# Patient Record
Sex: FEMALE | Race: WHITE | URBAN - METROPOLITAN AREA
[De-identification: names, ages, dates, MRNs, and addresses within clinical notes are randomized per-mention and may not be internally consistent; named-entity substitution may affect disease eponyms.]

---

## 2017-05-11 ENCOUNTER — OFFICE VISIT (OUTPATIENT)
Dept: CARDIOLOGY CLINIC | Age: 77
End: 2017-05-11

## 2017-05-11 VITALS
SYSTOLIC BLOOD PRESSURE: 142 MMHG | BODY MASS INDEX: 41.88 KG/M2 | HEART RATE: 53 BPM | OXYGEN SATURATION: 98 % | RESPIRATION RATE: 16 BRPM | DIASTOLIC BLOOD PRESSURE: 80 MMHG | HEIGHT: 62 IN | WEIGHT: 227.6 LBS

## 2017-05-11 DIAGNOSIS — I83.893 VARICOSE VEINS OF LOWER EXTREMITY WITH EDEMA, BILATERAL: ICD-10-CM

## 2017-05-11 DIAGNOSIS — I48.0 PAROXYSMAL A-FIB (HCC): ICD-10-CM

## 2017-05-11 DIAGNOSIS — I73.9 CLAUDICATION OF BOTH LOWER EXTREMITIES (HCC): ICD-10-CM

## 2017-05-11 DIAGNOSIS — E11.9 CONTROLLED TYPE 2 DIABETES MELLITUS WITHOUT COMPLICATION, WITHOUT LONG-TERM CURRENT USE OF INSULIN (HCC): ICD-10-CM

## 2017-05-11 DIAGNOSIS — I10 BENIGN ESSENTIAL HTN: ICD-10-CM

## 2017-05-11 DIAGNOSIS — I20.0 UNSTABLE ANGINA (HCC): Primary | ICD-10-CM

## 2017-05-11 DIAGNOSIS — Z79.01 CHRONIC ANTICOAGULATION: ICD-10-CM

## 2017-05-11 PROBLEM — I83.899 VARICOSE VEINS OF LOWER EXTREMITY WITH EDEMA: Status: ACTIVE | Noted: 2017-05-11

## 2017-05-11 NOTE — PROGRESS NOTES
[address]: Mukesh  [phone]    HPI:   Ms. Soren Alba is a 65 yo F with DM, HTN, hyperlipidemia seen initially afib with RVR on 7/4/13. She was started on diltiazem added to her BB for rate control and coumadin for anticoagulation (newer agents were cost prohibitive). Nuclear stress test on 7/17/13 noted a small partly reversible anterior defect, EF > 65. She was asymptomatic decided to medically manage. 9/2014 echo was normal with EF 65%. Echo 10/2016 was normal.    Since her last visit, she has noticed some more restriction with her breathing with exertion. She will more easily get out of breath when she is walking in the street. She also notes when she walks, she will get aching in her legs. She still has significant lower extremity edema, left greater than right. No palpitations. Her weight is unchanged at 227 pounds with blood pressure of 142/80 and a heart rate of 53. She is compensated on exam with clear lungs. Assessment and Plan:   1. Unstable angina. Exertional shortness of breath that is concerning for possible anginal equivalent and will proceed with a stress test for further evaluation. She cannot complete a treadmill due to her joint issues and will do this Lexiscan. She already had an echocardiogram a few months ago that was normal and no need to repeat this. 2. Lower extremity edema. Will obtain an ultrasound of her legs to evaluate further. Probably venous insufficiency if this is normal.  3. Claudication. Will obtain ABIs. 4. Paroxysmal atrial fibrillation. Stable in sinus rhythm. Will continue beta-blocker and calcium channel blocker. 5. Chronic anticoagulation. No bleeding issues on Coumadin. 6. Iron deficiency anemia. Followed by hematology, Dr. eSra Logan. 7. Essential hypertension. Blood pressure is controlled and no changes made. She has some white coat hypertension, mildly elevated here. 8. Mixed hyperlipidemia. Tolerating statin. 9. Type 2 diabetes. PCP Adry Price, [address] [facility] [address]   [phone]    no longer needing iron          She  has a past medical history of Diabetes ([facility] Essential hypertension; and Hyperlipidemia. All other systems negative except as above. PE  Vitals:    05/11/17 1201   BP: 142/80   Pulse: (!) 53   Resp: 16   SpO2: 98%   Weight: 227 lb 9.6 oz (103.2 kg)   Height: 5' 2\" (1.575 m)    Body mass index is 41.63 kg/(m^2). General appearance - alert, well appearing, and in no distress  Mental status - affect appropriate to mood  Eyes - sclera anicteric, moist mucous membranes  Neck - supple, no JVD  Chest - clear to auscultation, no wheezes, rales or rhonchi  Heart - normal rate, regular rhythm, normal S1, S2, I-II/VI systolic murmur LUSB  Abdomen - soft, nontender, nondistended, no masses or organomegaly  Extremities - peripheral pulses normal, no pedal edema    Recent Labs:  No results found for: CHOL  No results found for: MIRIAN  No results found for: BUN  No results found for: K  No results found for: HBA1C, [id number]  No components found for: HGBI,  IHGB,  HGB,  HGBP,  WBHGB  No results found for: PLT, PLTEXT    Reviewed:  Past Medical History:   Diagnosis Date    Diabetes ([facility]     Essential hypertension     Hyperlipidemia      History   Smoking Status    Former Smoker   Smokeless Tobacco    Never Used     History   Alcohol Use    Yes     Comment: Occasional-approx 1 drink per week     Allergies   Allergen Reactions    Nickel Rash     Redness       Current Outpatient Prescriptions   Medication Sig    diltiazem CD (CARDIZEM CD) 120 mg ER capsule Take 1 Cap by mouth daily.  atorvastatin (LIPITOR) 40 mg tablet Take 1 Tab by mouth daily.  warfarin (COUMADIN) 5 mg tablet TAKE 1 TAB BY MOUTH DAILY.  tolterodine ER (DETROL LA) 4 mg ER capsule Take 4 mg by mouth daily.     acetaminophen (TYLENOL EXTRA STRENGTH) 500 mg tablet Take 1,000 mg by mouth two (2) times daily as needed for Pain.  sitagliptin (JANUVIA) 100 mg tablet Take 100 mg by mouth daily.  esomeprazole (NEXIUM) 40 mg capsule Take  by mouth daily.  metFORMIN (GLUCOPHAGE) 1,000 mg tablet Take 1,000 mg by mouth. 1 tablet every morning 1/2 tablet at noon 1 tablet every evening    metoprolol (LOPRESSOR) 50 mg tablet Take 50 mg by mouth daily.  losartan (COZAAR) 100 mg tablet Take 100 mg by mouth daily. No current facility-administered medications for this visit.         Ivett Anderson MD  [facility] [place]  [address] [facility]  [address]

## 2017-05-11 NOTE — LETTER
5/11/2017 3:22 PM 
 
Patient:  Sulema Begum YOB: 1940 Date of Visit: 5/11/2017 Dear Anish Bird MD 
[place] [id number] VIA Facsimile: [phone] 
 : 
 
Ms. Sulema Begum is a 65 yo F with DM, HTN, hyperlipidemia seen initially afib with RVR on 7/4/13. She was started on diltiazem added to her BB for rate control and coumadin for anticoagulation (newer agents were cost prohibitive). Nuclear stress test on 7/17/13 noted a small partly reversible anterior defect, EF > 65. She was asymptomatic decided to medically manage. 9/2014 echo was normal with EF 65%. Echo 10/2016 was normal. 
 
Since her last visit, she has noticed some more restriction with her breathing with exertion. She will more easily get out of breath when she is walking in the street. She also notes when she walks, she will get aching in her legs. She still has significant lower extremity edema, left greater than right. No palpitations. Her weight is unchanged at 227 pounds with blood pressure of 142/80 and a heart rate of 53. She is compensated on exam with clear lungs. Assessment and Plan: 1. Unstable angina. Exertional shortness of breath that is concerning for possible anginal equivalent and will proceed with a stress test for further evaluation. She cannot complete a treadmill due to her joint issues and will do this Lexiscan. She already had an echocardiogram a few months ago that was normal and no need to repeat this. 2. Lower extremity edema. Will obtain an ultrasound of her legs to evaluate further. Probably venous insufficiency if this is normal. 
3. Claudication. Will obtain ABIs. 4. Paroxysmal atrial fibrillation. Stable in sinus rhythm. Will continue beta-blocker and calcium channel blocker. 5. Chronic anticoagulation. No bleeding issues on Coumadin. 6. Iron deficiency anemia. Followed by hematology, Dr. Maxim Russo. 7. Essential hypertension. Blood pressure is controlled and no changes made. She has some white coat hypertension, mildly elevated here. 8. Mixed hyperlipidemia. Tolerating statin. 9. Type 2 diabetes. If you have questions, please do not hesitate to call me. Sincerely, Gabriele Larose MD

## 2017-05-24 ENCOUNTER — CLINICAL SUPPORT (OUTPATIENT)
Dept: CARDIOLOGY CLINIC | Age: 77
End: 2017-05-24

## 2017-05-24 DIAGNOSIS — I20.0 UNSTABLE ANGINA (HCC): ICD-10-CM

## 2017-05-24 DIAGNOSIS — I73.9 CLAUDICATION OF BOTH LOWER EXTREMITIES (HCC): ICD-10-CM

## 2017-05-24 DIAGNOSIS — I48.0 PAROXYSMAL A-FIB (HCC): ICD-10-CM

## 2017-05-24 DIAGNOSIS — E11.9 CONTROLLED TYPE 2 DIABETES MELLITUS WITHOUT COMPLICATION, WITHOUT LONG-TERM CURRENT USE OF INSULIN (HCC): ICD-10-CM

## 2017-05-24 DIAGNOSIS — I10 BENIGN ESSENTIAL HTN: ICD-10-CM

## 2017-05-24 DIAGNOSIS — I83.893 VARICOSE VEINS OF LOWER EXTREMITY WITH EDEMA, BILATERAL: ICD-10-CM

## 2017-05-24 DIAGNOSIS — R06.02 SHORTNESS OF BREATH: ICD-10-CM

## 2017-05-24 DIAGNOSIS — Z79.01 CHRONIC ANTICOAGULATION: ICD-10-CM

## 2017-05-24 DIAGNOSIS — R94.31 ABNORMAL EKG: ICD-10-CM

## 2017-05-24 DIAGNOSIS — R60.0 BILATERAL EDEMA OF LOWER EXTREMITY: Primary | ICD-10-CM

## 2017-05-24 NOTE — PROGRESS NOTES
See scanned document. Ordering Doctor:Dr. Osorio Higginbotham  Reading Doctor:Dr. Osorio Higginbotham    Patient tolerated procedure well.

## 2017-05-24 NOTE — PROCEDURES
[facility]  *** FINAL REPORT ***    Name: Sherine Weston  MRN: [id number]       Outpatient  : 1940  HIS Order #: [id number]  [id number] [facility] Visit #: [id number]  Date: 24 May 2017    TYPE OF TEST: Peripheral Arterial Testing    REASON FOR TEST  Claudication (both sides)    Right Leg  Segmentals: Normal                     mmHg  Brachial         171  High thigh  Low thigh  Calf  Posterior tibial 194  Dorsalis pedis   187  Peroneal  Metatarsal  Toe pressure  Doppler:    Normal  PVR:        Normal  Ankle/Brachial: 1.07    Left Leg  Segmentals: Normal                     mmHg  Brachial         181  High thigh  Low thigh  Calf  Posterior tibial 188  Dorsalis pedis   181  Peroneal  Metatarsal  Toe pressure  Doppler:    Normal  PVR:        Normal  Ankle/Brachial: 1.04  Post exercise results:  Speed:  mph  Grade:  Duration:     Brachial  Right Ankle  RAINER    Left Ankle  RAINER    1:               188                202  2:  3:  4:  5:    INTERPRETATION/FINDINGS  PROCEDURE:  Evaluation of lower extremity arteries with systolic blood   pressure measurement at the ankle and brachial level and calculation  of the ankle/brachial pressure index (RAINER). The exam may also include   pulse volume recording (PVR) plethysmography at the ankle level. FINDINGS:  Multiphasic Doppler waveforms are exhibited within the  distal posterior tibial and dorsalis pedis arteries. Pulse volume  recordings at the ankle and metatarsal levels appear adequate. Resting ankle/brachial indices are normal at 1.07 on the right and  1.04 on the left. Low level exercise was performed by walking in the  hallway. Post exercise, there was a rise in ankle pressure  bilaterally. IMPRESSION:  No evidence of arterial occlusive disease in either lower   extremity at rest or post low level exercise. ADDITIONAL COMMENTS    I have personally reviewed the data relevant to the interpretation of  this  study.     TECHNOLOGIST: Lakeisha Dickson Rebeca Cuevas RVT, [address], [facility]  Signed: 05/24/2017 02:53 PM    PHYSICIAN: Ventura Edmonds.  Maame Inman MD  Signed: 05/26/2017 01:49 PM

## 2017-05-24 NOTE — PROCEDURES
[facility]  *** FINAL REPORT ***    Name: Carly Townsend  MRN: [id number]       Outpatient  : 1940  HIS Order #: [id number]  [address] [place] Visit #: [id number]  Date: 24 May 2017    TYPE OF TEST: Peripheral Venous Testing    REASON FOR TEST  Edema in legs (left > right)    Right Leg:-  Deep venous thrombosis:           No  Superficial venous thrombosis:    No  Deep venous insufficiency:        No  Superficial venous insufficiency: Yes    Left Leg:-  Deep venous thrombosis:           No  Superficial venous thrombosis:    No  Deep venous insufficiency:        No  Superficial venous insufficiency: Yes      INTERPRETATION/FINDINGS  PROCEDURE:  BILATERAL LE VENOUS DUPLEX. Evaluation of lower extremity veins with ultrasound (B-mode imaging,  pulsed Doppler, color Doppler). Includes the common femoral, deep  femoral, femoral, popliteal, posterior tibial, peroneal, and great  saphenous veins. FINDINGS:  Dorthea  scale and color flow duplex images of the veins in  both lower extremities demonstrate normal compressibility, spontaneous   and augmented flow profiles, and absence of filling defects  throughout the deep and superficial veins. Provocative maneuvers  elicit mild reflux in the right greater saphenous vein in the calf and   significant reflux  in the left greater saphenous vein at the level  of the saphenofemoral junction. CONCLUSION:  1)  No evidence of deep or superficial venous thrombosis in the  bilateral lower extremity. 2)  There is mild valvular incompetence within the right greater  saphenous vein at the calf level. 3)  There is significant valvular incompetence within the left greater   saphenous vein at the level of the saphenofemoral junction. ADDITIONAL COMMENTS    I have personally reviewed the data relevant to the interpretation of  this  study. TECHNOLOGIST: Rocio Jarvis RVT, RDMS, RDCS  Signed: 2017 11:46 AM    PHYSICIAN: Laura Lopez MD  Signed: 05/26/2017 01:54 PM

## 2017-05-25 ENCOUNTER — TELEPHONE (OUTPATIENT)
Dept: CARDIOLOGY CLINIC | Age: 77
End: 2017-05-25

## 2017-05-25 NOTE — TELEPHONE ENCOUNTER
----- Message from Julia Harrison MD sent at 5/25/2017 11:38 AM EDT -----  Please have pt come in early next week to discuss results of stress test; non urgent but need to discuss.   thx      Called patient and scheduled a test results appointment:    Future Appointments  Date Time Provider Lucita Churchill   5/31/2017 2:40 PM MD BIBI Diamond   11/10/2017 11:00 AM Julia Harrison  [facility]     2 pt identifiers used

## 2017-05-30 ENCOUNTER — TELEPHONE (OUTPATIENT)
Dept: CARDIOLOGY CLINIC | Age: 77
End: 2017-05-30

## 2017-05-30 NOTE — TELEPHONE ENCOUNTER
----- Message from Cornel Love MD sent at 5/28/2017 12:03 PM EDT -----  Please let pt know no DVT. thx  ----- Message -----     From: Farrukh Fischer MD     Sent: 5/26/2017   1:54 PM       To: Cornel Love MD        Above results given to patient.   2 pt identifiers used

## 2017-05-30 NOTE — TELEPHONE ENCOUNTER
----- Message from Rasheed Manzano MD sent at 5/27/2017 12:57 PM EDT -----  Please let pt know RAINER was normal. thx  ----- Message -----     From: Anthony Dupree MD     Sent: 5/26/2017   1:49 PM       To: Rasheed Manzano MD        Above results given to patient.   2 pt identifiers used      Future Appointments  Date Time Provider Lucita Kerline   5/31/2017 2:40 PM Rasheed Manzano  [address]   11/10/2017 11:00 AM Rasheed Manzano  [address]

## 2017-05-31 ENCOUNTER — OFFICE VISIT (OUTPATIENT)
Dept: CARDIOLOGY CLINIC | Age: 77
End: 2017-05-31

## 2017-05-31 VITALS
DIASTOLIC BLOOD PRESSURE: 80 MMHG | HEIGHT: 62 IN | HEART RATE: 62 BPM | RESPIRATION RATE: 16 BRPM | WEIGHT: 225.8 LBS | SYSTOLIC BLOOD PRESSURE: 130 MMHG | BODY MASS INDEX: 41.55 KG/M2

## 2017-05-31 DIAGNOSIS — I10 BENIGN ESSENTIAL HTN: ICD-10-CM

## 2017-05-31 DIAGNOSIS — Z79.01 CHRONIC ANTICOAGULATION: ICD-10-CM

## 2017-05-31 DIAGNOSIS — I48.0 PAROXYSMAL A-FIB (HCC): ICD-10-CM

## 2017-05-31 DIAGNOSIS — I10 ESSENTIAL HYPERTENSION: ICD-10-CM

## 2017-05-31 DIAGNOSIS — R06.09 DYSPNEA ON EXERTION: Primary | ICD-10-CM

## 2017-05-31 DIAGNOSIS — E11.9 CONTROLLED TYPE 2 DIABETES MELLITUS WITHOUT COMPLICATION, WITHOUT LONG-TERM CURRENT USE OF INSULIN (HCC): ICD-10-CM

## 2017-05-31 DIAGNOSIS — R60.0 EDEMA OF LEFT LOWER EXTREMITY: ICD-10-CM

## 2017-05-31 NOTE — PROGRESS NOTES
[address]: Mukesh  [phone]    HPI:   Ms. Sulema Begum is a 75 yo F with DM, HTN, hyperlipidemia seen initially afib with RVR on 7/4/13. She was started on diltiazem added to her BB for rate control and coumadin for anticoagulation (newer agents were cost prohibitive). Nuclear stress test on 7/17/13 noted a small partly reversible anterior defect, EF > 65. She was asymptomatic decided to medically manage. 9/2014 echo was normal with EF 65%. Echo 10/2016 was normal.    On her last visit, she was having exertional shortness of breath concerning for anginal equivalent, as well as lower extremity edema and proceeded with a stress test.  This did show a reversible anterior apical defect, but with normal ejection fraction, most consistent with shift in breast artifact and not true ischemia. She also had an ultrasound of her leg that was negative for DVT. It did show some valvular incompetence of the left greater saphenous vein at the level of the saphenofemoral junction. From a symptom standpoint, she has been feeling okay and trying to walk more. When she goes up a hill, she does get some shortness of breath. With her walking, the swelling in her legs has come down some. She denies any chest pain. She is compensated on exam with clear lungs. Blood pressure was 130/80 with a heart rate of 62 bpm.      Assessment and Plan:   1. Exertional shortness of breath. We discussed her stress test results and the result was low risk and more consistent with artifact rather than ischemia from breast attenuation/shift in breast.  She is feeling better. For now, recommended she steadily increase her activity and follow back in three months. If she has a decline or exertional chest pains, may need to reconsider and we talked a little bit about the possibility of a cardiac catheterization. 2. Lower extremity edema. Ultrasound noted some venous valvular incompetence, but no DVT.    3. Paroxysmal atrial fibrillation. Stable in sinus rhythm on beta-blocker and calcium channel blocker. 4. Chronic anticoagulation. No bleeding issues on Coumadin. 5. Iron deficiency anemia. Followed by hematology, Dr. Lindsey Baker. 6. Essential hypertension. Blood pressure is controlled, although she has had some white coat hypertension in the past.   7. Mixed hyperlipidemia. Tolerating statin. 8. Type 2 diabetes. PCP Dr. Teodoro Oppenheim  Adry Perry, [address]   [phone]    no longer needing iron          She  has a past medical history of Diabetes ([facility] Essential hypertension; and Hyperlipidemia. All other systems negative except as above. PE  Vitals:    05/31/17 1432   BP: 130/80   Pulse: 62   Resp: 16   Weight: 225 lb 12.8 oz (102.4 kg)   Height: 5' 2\" (1.575 m)    Body mass index is 41.3 kg/(m^2).    General appearance - alert, well appearing, and in no distress  Mental status - affect appropriate to mood  Eyes - sclera anicteric, moist mucous membranes  Neck - supple, no JVD  Chest - clear to auscultation, no wheezes, rales or rhonchi  Heart - normal rate, regular rhythm, normal S1, S2, I-II/VI systolic murmur LUSB  Abdomen - soft, nontender, nondistended, no masses or organomegaly  Extremities - peripheral pulses normal, no pedal edema    Recent Labs:  No results found for: CHOL  No results found for: MIRIAN  No results found for: BUN  No results found for: K  No results found for: HBA1C, CWH5AVNR  No components found for: HGBI,  IHGB,  HGB,  HGBP,  WBHGB  No results found for: PLT, PLTEXT    Reviewed:  Past Medical History:   Diagnosis Date    [facility]     Essential hypertension     Hyperlipidemia      History   Smoking Status    Former Smoker   Smokeless Tobacco    Never Used     History   Alcohol Use    Yes     Comment: Occasional-approx 1 drink per week     Allergies   Allergen Reactions    Nickel Rash     Redness       Current Outpatient Prescriptions   Medication Sig  diltiazem CD (CARDIZEM CD) 120 mg ER capsule Take 1 Cap by mouth daily.  atorvastatin (LIPITOR) 40 mg tablet Take 1 Tab by mouth daily.  warfarin (COUMADIN) 5 mg tablet TAKE 1 TAB BY MOUTH DAILY.  tolterodine ER (DETROL LA) 4 mg ER capsule Take 4 mg by mouth daily.  acetaminophen (TYLENOL EXTRA STRENGTH) 500 mg tablet Take 1,000 mg by mouth two (2) times daily as needed for Pain.  sitagliptin (JANUVIA) 100 mg tablet Take 100 mg by mouth daily.  esomeprazole (NEXIUM) 40 mg capsule Take  by mouth daily.  metFORMIN (GLUCOPHAGE) 1,000 mg tablet Take 1,000 mg by mouth. 1 tablet every morning 1/2 tablet at noon 1 tablet every evening    metoprolol (LOPRESSOR) 50 mg tablet Take 50 mg by mouth daily.  losartan (COZAAR) 100 mg tablet Take 100 mg by mouth daily. No current facility-administered medications for this visit.         Tyrell Gomez MD  [facility] heart and Vascular [place]  [address] [facility],[address]  [facility], [address]

## 2017-05-31 NOTE — LETTER
6/1/2017 4:04 PM 
Patient:  Filipe Moss YOB: 1940 Date of Visit: 5/31/2017 Dear Jose Baker MD 
[address] [address] VIA Facsimile: [phone] 
 : 
Thank you for referring Ms. Filipe Moss to me for evaluation/treatment. Below are the relevant portions of my assessment and plan of care. Ms. Filipe Moss is a 75 yo F with DM, HTN, hyperlipidemia seen initially afib with RVR on 7/4/13. She was started on diltiazem added to her BB for rate control and coumadin for anticoagulation (newer agents were cost prohibitive). Nuclear stress test on 7/17/13 noted a small partly reversible anterior defect, EF > 65. She was asymptomatic decided to medically manage. 9/2014 echo was normal with EF 65%. Echo 10/2016 was normal. 
 
On her last visit, she was having exertional shortness of breath concerning for anginal equivalent, as well as lower extremity edema and proceeded with a stress test.  This did show a reversible anterior apical defect, but with normal ejection fraction, most consistent with shift in breast artifact and not true ischemia. She also had an ultrasound of her leg that was negative for DVT. It did show some valvular incompetence of the left greater saphenous vein at the level of the saphenofemoral junction. From a symptom standpoint, she has been feeling okay and trying to walk more. When she goes up a hill, she does get some shortness of breath. With her walking, the swelling in her legs has come down some. She denies any chest pain. She is compensated on exam with clear lungs. Blood pressure was 130/80 with a heart rate of 62 bpm.   
 
Assessment and Plan: 1. Exertional shortness of breath. We discussed her stress test results and the result was low risk and more consistent with artifact rather than ischemia from breast attenuation/shift in breast.  She is feeling better. For now, recommended she steadily increase her activity and follow back in three months. If she has a decline or exertional chest pains, may need to reconsider and we talked a little bit about the possibility of a cardiac catheterization. 2. Lower extremity edema. Ultrasound noted some venous valvular incompetence, but no DVT. 3. Paroxysmal atrial fibrillation. Stable in sinus rhythm on beta-blocker and calcium channel blocker. 4. Chronic anticoagulation. No bleeding issues on Coumadin. 5. Iron deficiency anemia. Followed by hematology, Dr. Jacqulyne Boast. 6. Essential hypertension. Blood pressure is controlled, although she has had some white coat hypertension in the past.  
7. Mixed hyperlipidemia. Tolerating statin. 8. Type 2 diabetes. If you have questions, please do not hesitate to call me. Sincerely, Meredith Cheney MD

## 2017-05-31 NOTE — MR AVS SNAPSHOT
Visit Information Date & Time Provider Department Dept. Phone Encounter #  
 5/31/2017  2:40 PM Refugio Alejandra MD [facility] [place]  [phone] [id number] Your Appointments 11/10/2017 11:00 AM  
ESTABLISHED PATIENT with Refugio Alejandra MD  
[facility] ([address]) Appt Note: 6 month follow up  
 [address]  
[address] [facility] 7 [address] Upcoming Health Maintenance Date Due HEMOGLOBIN A1C Q6M 1940 FOOT EXAM Q1 2/16/1948 EYE EXAM RETINAL OR DILATED Q1 2/16/1948 DTaP/Tdap/Td series (1 - Tdap) 2/16/1959 ZOSTER VACCINE AGE 60> 2/16/1998 GLAUCOMA SCREENING Q2Y 2/16/2003 OSTEOPOROSIS SCREENING (DEXA) 2/16/2003 Pneumococcal 65+ Low/Medium Risk (1 of 2 - PCV13) 2/16/2003 MEDICARE YEARLY EXAM 2/16/2003 INFLUENZA AGE 9 TO ADULT 8/1/2017 MICROALBUMIN Q1 10/28/2017 LIPID PANEL Q1 10/28/2017 Allergies as of 5/31/2017  Review Complete On: 5/31/2017 By: Marianna Giron Severity Noted Reaction Type Reactions Nickel  09/28/2015    Rash Redness Current Immunizations  Never Reviewed No immunizations on file. Not reviewed this visit Vitals BP Pulse Resp Height(growth percentile) Weight(growth percentile) BMI  
 130/80 (BP 1 Location: Right arm, BP Patient Position: Sitting) 62 16 5' 2\" (1.575 m) 225 lb 12.8 oz (102.4 kg) 41.3 kg/m2 Smoking Status Former Smoker BMI and BSA Data Body Mass Index Body Surface Area  
 41.3 kg/m 2 2.12 m 2 Preferred Pharmacy Pharmacy Name Phone [facility] [address] Your Updated Medication List  
  
   
This list is accurate as of: 5/31/17  3:07 PM.  Always use your most recent med list.  
  
  
  
  
 atorvastatin 40 mg tablet Commonly known as:  LIPITOR Take 1 Tab by mouth daily. dilTIAZem  mg ER capsule Commonly known as:  CARDIZEM CD Take 1 Cap by mouth daily. JANUVIA 100 mg tablet Generic drug:  SITagliptin Take 100 mg by mouth daily. losartan 100 mg tablet Commonly known as:  COZAAR Take 100 mg by mouth daily. metFORMIN 1,000 mg tablet Commonly known as:  GLUCOPHAGE Take 1,000 mg by mouth. 1 tablet every morning 1/2 tablet at noon 1 tablet every evening  
  
 metoprolol tartrate 50 mg tablet Commonly known as:  LOPRESSOR Take 50 mg by mouth daily. NexIUM 40 mg capsule Generic drug:  esomeprazole Take  by mouth daily. tolterodine ER 4 mg ER capsule Commonly known as:  Verla Gupta Take 4 mg by mouth daily. TYLENOL EXTRA STRENGTH 500 mg tablet Generic drug:  acetaminophen Take 1,000 mg by mouth two (2) times daily as needed for Pain.  
  
 warfarin 5 mg tablet Commonly known as:  COUMADIN  
TAKE 1 TAB BY MOUTH DAILY. Introducing [facility]! Laurie Crowe introduces [employer] Bucket patient portal. Now you can access parts of your medical record, email your doctor's office, and request medication refills online. 1. In your internet browser, go to [email or web address]/"[email or web address] 2. Click on the First Time User? Click Here link in the Sign In box. You will see the New Member Sign Up page. 3. Enter your Smash Bucket Access Code exactly as it appears below. You will not need to use this code after youve completed the sign-up process. If you do not sign up before the expiration date, you must request a new code. · Smash Bucket Access Code: [email or web address] Expires: 8/22/2017  3:07 PM 
 
4. Enter the last four digits of your Social Security Number (xxxx) and Date of Birth (mm/dd/yyyy) as indicated and click Submit. You will be taken to the next sign-up page. 5. Create a niid.to ID. This will be your niid.to login ID and cannot be changed, so think of one that is secure and easy to remember. 6. Create a niid.to password. You can change your password at any time. 7. Enter your Password Reset Question and Answer. This can be used at a later time if you forget your password. 8. Enter your e-mail address. You will receive e-mail notification when new information is available in [address] 9. Click Sign Up. You can now view and download portions of your medical record. 10. Click the Download Summary menu link to download a portable copy of your medical information. If you have questions, please visit the Frequently Asked Questions section of the niid.to website. Remember, niid.to is NOT to be used for urgent needs. For medical emergencies, dial 911. Now available from your iPhone and Android! Please provide this summary of care documentation to your next provider. Your primary care clinician is listed as MELISSA SUBRAMANIAN. If you have any questions after today's visit, please call [phone].

## 2017-07-21 RX ORDER — WARFARIN SODIUM 5 MG/1
TABLET ORAL
Qty: 90 TAB | Refills: 0 | Status: SHIPPED | OUTPATIENT
Start: 2017-07-21 | End: 2017-11-06 | Stop reason: SDUPTHER

## 2017-08-30 ENCOUNTER — OFFICE VISIT (OUTPATIENT)
Dept: CARDIOLOGY CLINIC | Age: 77
End: 2017-08-30

## 2017-08-30 VITALS
HEART RATE: 59 BPM | BODY MASS INDEX: 41.04 KG/M2 | DIASTOLIC BLOOD PRESSURE: 80 MMHG | SYSTOLIC BLOOD PRESSURE: 140 MMHG | WEIGHT: 223 LBS | HEIGHT: 62 IN | OXYGEN SATURATION: 98 %

## 2017-08-30 DIAGNOSIS — Z79.01 CHRONIC ANTICOAGULATION: ICD-10-CM

## 2017-08-30 DIAGNOSIS — R06.02 SHORTNESS OF BREATH: Primary | ICD-10-CM

## 2017-08-30 DIAGNOSIS — I10 BENIGN ESSENTIAL HTN: ICD-10-CM

## 2017-08-30 DIAGNOSIS — I48.0 PAROXYSMAL A-FIB (HCC): ICD-10-CM

## 2017-08-30 DIAGNOSIS — E11.9 CONTROLLED TYPE 2 DIABETES MELLITUS WITHOUT COMPLICATION, WITHOUT LONG-TERM CURRENT USE OF INSULIN (HCC): ICD-10-CM

## 2017-08-30 RX ORDER — FENOFIBRATE 160 MG/1
160 TABLET ORAL DAILY
COMMUNITY

## 2017-08-30 NOTE — PROGRESS NOTES
[place] [address]: [place]  [phone]    HPI:   Ms. Shagufta Soto is a 75 yo F with DM, HTN, hyperlipidemia seen initially afib with RVR on 7/4/13. She was started on diltiazem added to her BB for rate control and coumadin for anticoagulation (newer agents were cost prohibitive). Nuclear stress test on 7/17/13 noted a small partly reversible anterior defect, EF > 65. She was asymptomatic decided to medically manage. 9/2014 echo was normal with EF 65%. Echo 10/2016 was normal.  Nuclear stress test 5/2017 noted a reversible anterior apical defect, but with normal ejection fraction, most consistent with shift in breast artifact and not true ischemia. She also had an ultrasound of her leg that was negative for DVT. It did show some valvular incompetence of the left greater saphenous vein at the level of the saphenofemoral junction. Since her last visit, she has overall been doing okay. She did start fenofibrate for elevated triglycerides and this has helped. Unfortunately, it did increase her INR and she had to make adjustments with that. She is doing physical therapy for her legs. RAINER and ultrasound earlier this year were normal.  From a symptom standpoint, she denies any chest pain. Her breathing has been stable. No significant palpitations. She is compensated on exam with clear lungs. Assessment and Plan:   1. Exertional shortness of breath. Her breathing is stable. Stress test in the past was low risk and continue medical management. 2. Lower extremity edema. Ultrasound noted some venous insufficiency, but no DVT. RAINER in 05/2017 was normal.    3. Paroxysmal atrial fibrillation. Stable in sinus rhythm on beta-blocker and calcium channel blocker. 4. Chronic anticoagulation. Her INR was labile, but it is okay now and will continue Coumadin. 5. Iron deficiency anemia. Followed by hematology, Dr. Melania Fernando. 6. Essential hypertension.   Blood pressure is controlled and no changes made.  History of white coat hypertension. 7. Mixed hyperlipidemia. Tolerating statin. 8. Type 2 diabetes. PCP Dr. Radha Campo  [facility], [address] [place] [address] Road   [phone]     She  has a past medical history of [facility] ([facility] Essential hypertension; and Hyperlipidemia. All other systems negative except as above. PE  Vitals:    08/30/17 1409   BP: 140/80   Pulse: (!) 59   SpO2: 98%   Weight: 223 lb (101.2 kg)   Height: 5' 2\" (1.575 m)    Body mass index is 40.79 kg/(m^2). General appearance - alert, well appearing, and in no distress  Mental status - affect appropriate to mood  Eyes - sclera anicteric, moist mucous membranes  Neck - supple, no JVD  Chest - clear to auscultation, no wheezes, rales or rhonchi  Heart - normal rate, regular rhythm, normal S1, S2, I-II/VI systolic murmur LUSB  Abdomen - soft, nontender, nondistended, no masses or organomegaly  Extremities - peripheral pulses normal, no pedal edema    Recent Labs:  No results found for: CHOL  No results found for: MIRIAN  No results found for: BUN  No results found for: K  No results found for: HBA1C, NTL3XMLN  No components found for: HGBI,  IHGB,  HGB,  HGBP,  WBHGB  No results found for: PLT, PLTEXT    Reviewed:  Past Medical History:   Diagnosis Date    Diabetes ([facility]     Essential hypertension     Hyperlipidemia      History   Smoking Status    Former Smoker   Smokeless Tobacco    Never Used     History   Alcohol Use    Yes     Comment: Occasional-approx 1 drink per week     Allergies   Allergen Reactions    Nickel Rash     Redness       Current Outpatient Prescriptions   Medication Sig    fenofibrate (LOFIBRA) 160 mg tablet Take 160 mg by mouth daily.  warfarin (COUMADIN) 5 mg tablet TAKE 1 TAB BY MOUTH DAILY. (Patient taking differently: TAKE 0.5 tablet Sunday and Wed. and 5 mg other days)    diltiazem CD (CARDIZEM CD) 120 mg ER capsule Take 1 Cap by mouth daily.     atorvastatin (LIPITOR) 40 mg tablet Take 1 Tab by mouth daily.  tolterodine ER (DETROL LA) 4 mg ER capsule Take 4 mg by mouth daily.  acetaminophen (TYLENOL EXTRA STRENGTH) 500 mg tablet Take 1,000 mg by mouth two (2) times daily as needed for Pain.  sitagliptin (JANUVIA) 100 mg tablet Take 100 mg by mouth daily.  esomeprazole (NEXIUM) 40 mg capsule Take  by mouth daily.  metFORMIN (GLUCOPHAGE) 1,000 mg tablet Take 1,000 mg by mouth. 1 tablet every morning 1/2 tablet at noon 1 tablet every evening    metoprolol (LOPRESSOR) 50 mg tablet Take 50 mg by mouth daily.  losartan (COZAAR) 100 mg tablet Take 100 mg by mouth daily. No current facility-administered medications for this visit.         Hira Hernadez MD  [facility] [place]  [facility] 84 301 [facility] 83,[address]  [address]

## 2017-08-30 NOTE — MR AVS SNAPSHOT
Visit Information Date & Time Provider Department Dept. Phone Encounter #  
 8/30/2017  2:20 PM Yonas Armstrong MD [facility] [facility] [phone] [id number] Your Appointments 11/10/2017 11:00 AM  
ESTABLISHED PATIENT with Yonas Armstrong MD  
[facility] ([address]) Appt Note: 6 month follow up  
 [address]  
[address] [facility] [address] Upcoming Health Maintenance Date Due HEMOGLOBIN A1C Q6M 1940 FOOT EXAM Q1 2/16/1948 EYE EXAM RETINAL OR DILATED Q1 2/16/1948 DTaP/Tdap/Td series (1 - Tdap) 2/16/1959 ZOSTER VACCINE AGE 60> 12/16/1997 GLAUCOMA SCREENING Q2Y 2/16/2003 OSTEOPOROSIS SCREENING (DEXA) 2/16/2003 Pneumococcal 65+ Low/Medium Risk (1 of 2 - PCV13) 2/16/2003 MEDICARE YEARLY EXAM 2/16/2003 INFLUENZA AGE 9 TO ADULT 8/1/2017 MICROALBUMIN Q1 10/28/2017 LIPID PANEL Q1 10/28/2017 Allergies as of 8/30/2017  Review Complete On: 8/30/2017 By: Anna Alvarez  
  
 Severity Noted Reaction Type Reactions Nickel  09/28/2015    Rash Redness Current Immunizations  Never Reviewed No immunizations on file. Not reviewed this visit Vitals BP Pulse Height(growth percentile) Weight(growth percentile) SpO2 BMI  
 140/80 (BP 1 Location: Right arm, BP Patient Position: Sitting) (!) 59 5' 2\" (1.575 m) 223 lb (101.2 kg) 98% 40.79 kg/m2 Smoking Status Former Smoker Vitals History BMI and BSA Data Body Mass Index Body Surface Area 40.79 kg/m 2 2.1 m 2 Preferred Pharmacy Pharmacy Name Phone [facility] [address] Your Updated Medication List  
  
   
This list is accurate as of: 8/30/17  2:33 PM.  Always use your most recent med list.  
  
  
  
  
 atorvastatin 40 mg tablet Commonly known as:  LIPITOR Take 1 Tab by mouth daily. dilTIAZem  mg ER capsule Commonly known as:  CARDIZEM CD Take 1 Cap by mouth daily. fenofibrate 160 mg tablet Commonly known as:  LOFIBRA Take 160 mg by mouth daily. JANUVIA 100 mg tablet Generic drug:  SITagliptin Take 100 mg by mouth daily. losartan 100 mg tablet Commonly known as:  COZAAR Take 100 mg by mouth daily. metFORMIN 1,000 mg tablet Commonly known as:  GLUCOPHAGE Take 1,000 mg by mouth. 1 tablet every morning 1/2 tablet at noon 1 tablet every evening  
  
 metoprolol tartrate 50 mg tablet Commonly known as:  LOPRESSOR Take 50 mg by mouth daily. NexIUM 40 mg capsule Generic drug:  esomeprazole Take  by mouth daily. tolterodine ER 4 mg ER capsule Commonly known as:  Quinton Magyar Take 4 mg by mouth daily. TYLENOL EXTRA STRENGTH 500 mg tablet Generic drug:  acetaminophen Take 1,000 mg by mouth two (2) times daily as needed for Pain.  
  
 warfarin 5 mg tablet Commonly known as:  COUMADIN  
TAKE 1 TAB BY MOUTH DAILY. Introducing [facility]! Marlen Durant introduces datango patient portal. Now you can access parts of your medical record, email your doctor's office, and request medication refills online. 1. In your internet browser, go to [email or web address] 2. Click on the First Time User? Click Here link in the Sign In box. You will see the New Member Sign Up page. 3. Enter your datango Access Code exactly as it appears below. You will not need to use this code after youve completed the sign-up process. If you do not sign up before the expiration date, you must request a new code. · datango Access Code: [email or web address] Expires: 11/28/2017  2:33 PM 
 
4. Enter the last four digits of your Social Security Number (xxxx) and Date of Birth (mm/dd/yyyy) as indicated and click Submit.  You will be taken to the next sign-up page. 5. Create a Thermalin Diabetes ID. This will be your Thermalin Diabetes login ID and cannot be changed, so think of one that is secure and easy to remember. 6. Create a Thermalin Diabetes password. You can change your password at any time. 7. Enter your Password Reset Question and Answer. This can be used at a later time if you forget your password. 8. Enter your e-mail address. You will receive e-mail notification when new information is available in [address] 9. Click Sign Up. You can now view and download portions of your medical record. 10. Click the Download Summary menu link to download a portable copy of your medical information. If you have questions, please visit the Frequently Asked Questions section of the Thermalin Diabetes website. Remember, Thermalin Diabetes is NOT to be used for urgent needs. For medical emergencies, dial 911. Now available from your iPhone and Android! Please provide this summary of care documentation to your next provider. Your primary care clinician is listed as [employer]. If you have any questions after today's visit, please call [phone].

## 2017-08-30 NOTE — LETTER
8/30/2017 11:23 PM 
 
Patient:  Lenord Cooks YOB: 1940 Date of Visit: 8/30/2017 Dear Jim Martin MD 
[place] [address] VIA Facsimile: [phone] 
 : 
Ms. Lenord Cooks is a 77 yo F with DM, HTN, hyperlipidemia seen initially afib with RVR on 7/4/13. She was started on diltiazem added to her BB for rate control and coumadin for anticoagulation (newer agents were cost prohibitive). Nuclear stress test on 7/17/13 noted a small partly reversible anterior defect, EF > 65. She was asymptomatic decided to medically manage. 9/2014 echo was normal with EF 65%. Echo 10/2016 was normal.  Nuclear stress test 5/2017 noted a reversible anterior apical defect, but with normal ejection fraction, most consistent with shift in breast artifact and not true ischemia. She also had an ultrasound of her leg that was negative for DVT. It did show some valvular incompetence of the left greater saphenous vein at the level of the saphenofemoral junction. Since her last visit, she has overall been doing okay. She did start fenofibrate for elevated triglycerides and this has helped. Unfortunately, it did increase her INR and she had to make adjustments with that. She is doing physical therapy for her legs. RAINER and ultrasound earlier this year were normal.  From a symptom standpoint, she denies any chest pain. Her breathing has been stable. No significant palpitations. She is compensated on exam with clear lungs. Assessment and Plan: 1. Exertional shortness of breath. Her breathing is stable. Stress test in the past was low risk and continue medical management. 2. Lower extremity edema. Ultrasound noted some venous insufficiency, but no DVT. RAINER in 05/2017 was normal.   
3. Paroxysmal atrial fibrillation. Stable in sinus rhythm on beta-blocker and calcium channel blocker. 4. Chronic anticoagulation.   Her INR was labile, but it is okay now and will continue Coumadin. 5. Iron deficiency anemia. Followed by hematology, Dr. Debbie Gonsalez. 6. Essential hypertension. Blood pressure is controlled and no changes made. History of white coat hypertension. 7. Mixed hyperlipidemia. Tolerating statin. 8. Type 2 diabetes. If you have questions, please do not hesitate to call me. Sincerely, Estefania Valdez MD

## 2017-09-04 RX ORDER — ATORVASTATIN CALCIUM 40 MG/1
TABLET, FILM COATED ORAL
Qty: 90 TAB | Refills: 3 | Status: SHIPPED | OUTPATIENT
Start: 2017-09-04 | End: 2018-08-13 | Stop reason: SDUPTHER

## 2017-09-04 RX ORDER — DILTIAZEM HYDROCHLORIDE 120 MG/1
CAPSULE, COATED, EXTENDED RELEASE ORAL
Qty: 90 CAP | Refills: 3 | Status: SHIPPED | OUTPATIENT
Start: 2017-09-04 | End: 2018-08-13 | Stop reason: SDUPTHER

## 2017-11-06 RX ORDER — WARFARIN SODIUM 5 MG/1
TABLET ORAL
Qty: 90 TAB | Refills: 0 | Status: SHIPPED | OUTPATIENT
Start: 2017-11-06 | End: 2018-02-06 | Stop reason: SDUPTHER

## 2017-11-10 ENCOUNTER — OFFICE VISIT (OUTPATIENT)
Dept: CARDIOLOGY CLINIC | Age: 77
End: 2017-11-10

## 2017-11-10 VITALS
DIASTOLIC BLOOD PRESSURE: 70 MMHG | SYSTOLIC BLOOD PRESSURE: 132 MMHG | WEIGHT: 223 LBS | BODY MASS INDEX: 41.04 KG/M2 | HEART RATE: 66 BPM | HEIGHT: 62 IN

## 2017-11-10 DIAGNOSIS — Z79.01 CHRONIC ANTICOAGULATION: ICD-10-CM

## 2017-11-10 DIAGNOSIS — R60.0 EDEMA OF LEFT LOWER EXTREMITY: ICD-10-CM

## 2017-11-10 DIAGNOSIS — E11.9 CONTROLLED TYPE 2 DIABETES MELLITUS WITHOUT COMPLICATION, WITHOUT LONG-TERM CURRENT USE OF INSULIN (HCC): ICD-10-CM

## 2017-11-10 DIAGNOSIS — I10 BENIGN ESSENTIAL HTN: Primary | ICD-10-CM

## 2017-11-10 DIAGNOSIS — I48.0 PAROXYSMAL A-FIB (HCC): ICD-10-CM

## 2017-11-10 NOTE — MR AVS SNAPSHOT
Visit Information Date & Time Provider Department Dept. Phone Encounter #  
 11/10/2017 11:00 AM Florentino Munroe MD [facility] [place] [phone] [id number] Your Appointments 2/28/2018  2:00 PM  
ESTABLISHED PATIENT with Florentino Munroe MD  
[facility] ([address]) Appt Note: 6 month follow up  
 [address]  
[address] [facility] 7 [address] Upcoming Health Maintenance Date Due  
 FOOT EXAM Q1 2/16/1948 EYE EXAM RETINAL OR DILATED Q1 2/16/1948 DTaP/Tdap/Td series (1 - Tdap) 2/16/1959 ZOSTER VACCINE AGE 60> 12/16/1997 GLAUCOMA SCREENING Q2Y 2/16/2003 OSTEOPOROSIS SCREENING (DEXA) 2/16/2003 Pneumococcal 65+ Low/Medium Risk (1 of 2 - PCV13) 2/16/2003 MEDICARE YEARLY EXAM 2/16/2003 HEMOGLOBIN A1C Q6M 4/28/2017 Influenza Age 5 to Adult 8/1/2017 MICROALBUMIN Q1 10/28/2017 LIPID PANEL Q1 10/28/2017 Allergies as of 11/10/2017  Review Complete On: 11/10/2017 By: Florentino Munroe MD  
  
 Severity Noted Reaction Type Reactions Nickel  09/28/2015    Rash Redness Current Immunizations  Never Reviewed No immunizations on file. Not reviewed this visit Vitals BP Pulse Height(growth percentile) Weight(growth percentile) BMI Smoking Status 132/70 (BP 1 Location: Left arm, BP Patient Position: Sitting) 66 5' 2\" (1.575 m) 223 lb (101.2 kg) 40.79 kg/m2 Former Smoker Vitals History BMI and BSA Data Body Mass Index Body Surface Area 40.79 kg/m 2 2.1 m 2 Preferred Pharmacy Pharmacy Name Phone [facility] [address] Your Updated Medication List  
  
   
This list is accurate as of: 11/10/17 11:23 AM.  Always use your most recent med list.  
  
  
  
  
 atorvastatin 40 mg tablet Commonly known as:  LIPITOR  
TAKE 1 TABLET BY MOUTH DAILY  
  
 dilTIAZem  mg ER capsule Commonly known as:  CARDIZEM CD  
TAKE 1 CAPSULE BY MOUTH DAILY  
  
 fenofibrate 160 mg tablet Commonly known as:  LOFIBRA Take 160 mg by mouth daily. JANUVIA 100 mg tablet Generic drug:  SITagliptin Take 100 mg by mouth daily. losartan 100 mg tablet Commonly known as:  COZAAR Take 100 mg by mouth daily. metFORMIN 1,000 mg tablet Commonly known as:  GLUCOPHAGE Take 1,000 mg by mouth. 1 tablet every morning 1/2 tablet at noon 1 tablet every evening  
  
 metoprolol tartrate 50 mg tablet Commonly known as:  LOPRESSOR Take 50 mg by mouth daily. NexIUM 40 mg capsule Generic drug:  esomeprazole Take  by mouth daily. tolterodine ER 4 mg ER capsule Commonly known as:  Richelle Hane Take 4 mg by mouth daily. TYLENOL EXTRA STRENGTH 500 mg tablet Generic drug:  acetaminophen Take 1,000 mg by mouth two (2) times daily as needed for Pain.  
  
 warfarin 5 mg tablet Commonly known as:  COUMADIN  
TAKE 1 TAB BY MOUTH DAILY. Introducing [facility]! [facility] introduces Doujiao patient portal. Now you can access parts of your medical record, email your doctor's office, and request medication refills online. 1. In your internet browser, go to [email or web address] 2. Click on the First Time User? Click Here link in the Sign In box. You will see the New Member Sign Up page. 3. Enter your Doujiao Access Code exactly as it appears below. You will not need to use this code after youve completed the sign-up process. If you do not sign up before the expiration date, you must request a new code. · Doujiao Access Code: [email or web address] Expires: 11/28/2017  1:33 PM 
 
4. Enter the last four digits of your Social Security Number (xxxx) and Date of Birth (mm/dd/yyyy) as indicated and click Submit.  You will be taken to the next sign-up page. 5. Create a Cuff-Protect ID. This will be your Cuff-Protect login ID and cannot be changed, so think of one that is secure and easy to remember. 6. Create a Cuff-Protect password. You can change your password at any time. 7. Enter your Password Reset Question and Answer. This can be used at a later time if you forget your password. 8. Enter your e-mail address. You will receive e-mail notification when new information is available in [address] 9. Click Sign Up. You can now view and download portions of your medical record. 10. Click the Download Summary menu link to download a portable copy of your medical information. If you have questions, please visit the Frequently Asked Questions section of the Cuff-[employer]ct website. Remember, Cuff-Protect is NOT to be used for urgent needs. For medical emergencies, dial 911. Now available from your iPhone and Android! Please provide this summary of care documentation to your next provider. Your primary care clinician is listed as [employer]. If you have any questions after today's visit, please call [phone].

## 2017-11-10 NOTE — PROGRESS NOTES
[address]: [place]  [phone]    HPI:   Ms. Asad Jim is a 75 yo F with DM, HTN, hyperlipidemia seen initially afib with RVR on 7/4/13. She was started on diltiazem added to her BB for rate control and coumadin for anticoagulation (newer agents were cost prohibitive). Nuclear stress test on 7/17/13 noted a small partly reversible anterior defect, EF > 65. She was asymptomatic decided to medically manage. 9/2014 echo was normal with EF 65%. Echo 10/2016 was normal.  Nuclear stress test 5/2017 noted a reversible anterior apical defect, but with normal ejection fraction, most consistent with shift in breast artifact and not true ischemia. She also had an ultrasound of her leg that was negative for DVT. It did show some valvular incompetence of the left greater saphenous vein at the level of the saphenofemoral junction. Since her last visit, she continues to do well. No chest pain. She has baseline shortness of breath that has been unchanged. No significant palpitations. She has been compliant with her medications. She did physical therapy for her legs and has joined a stretch flex class. She does some stationary bike at home as well. She is compensated on exam with clear lungs. Blood pressure is 132/70 with a heart rate of 66. Assessment and Plan:   1. Exertional shortness of breath. Her breathing has been stable. Stress test in the past was low risk and continue medical management. 2. Paroxysmal atrial fibrillation. Stable in sinus rhythm on beta-blocker, calcium channel blocker. 3. Chronic anticoagulation. No bleeding issues on Coumadin. 4. Lower extremity edema. Ultrasounds in the past with venous insufficiency, but no DVT. RAINER in 05/2017 was normal.    5. Iron deficiency anemia. Followed by hematology, Dr. Shai Anaya. 6. Essential hypertension. Blood pressure is controlled and no changes made. History of white coat hypertension. 7. Mixed hyperlipidemia. Tolerating statin. 8. Type 2 diabetes. PCP Dr. Jaleel Perry, [facility], [address]   [phone]     She  has a past medical history of Diabetes ([facility] Essential hypertension; and Hyperlipidemia. All other systems negative except as above. PE  Vitals:    11/10/17 1048   BP: 132/70   Pulse: 66   Weight: 223 lb (101.2 kg)   Height: 5' 2\" (1.575 m)    Body mass index is 40.79 kg/(m^2). General appearance - alert, well appearing, and in no distress  Mental status - affect appropriate to mood  Eyes - sclera anicteric, moist mucous membranes  Neck - supple, no JVD  Chest - clear to auscultation, no wheezes, rales or rhonchi  Heart - normal rate, regular rhythm, normal S1, S2, I-II/VI systolic murmur LUSB  Abdomen - soft, nontender, nondistended, no masses or organomegaly  Extremities - peripheral pulses normal, no pedal edema    Recent Labs:  No results found for: CHOL  No results found for: MIRIAN  No results found for: BUN  No results found for: K  No results found for: HBA1C, [id number]  No components found for: HGBI,  IHGB,  HGB,  HGBP,  WBHGB  No results found for: PLT, PLTEXT    Reviewed:  Past Medical History:   Diagnosis Date    Diabetes ([facility]     Essential hypertension     Hyperlipidemia      History   Smoking Status    Former Smoker   Smokeless Tobacco    Never Used     History   Alcohol Use    Yes     Comment: Occasional-approx 1 drink per week     Allergies   Allergen Reactions    Nickel Rash     Redness       Current Outpatient Prescriptions   Medication Sig    warfarin (COUMADIN) 5 mg tablet TAKE 1 TAB BY MOUTH DAILY.  atorvastatin (LIPITOR) 40 mg tablet TAKE 1 TABLET BY MOUTH DAILY    dilTIAZem CD (CARDIZEM CD) 120 mg ER capsule TAKE 1 CAPSULE BY MOUTH DAILY    fenofibrate (LOFIBRA) 160 mg tablet Take 160 mg by mouth daily.  tolterodine ER (DETROL LA) 4 mg ER capsule Take 4 mg by mouth daily.     acetaminophen (TYLENOL EXTRA STRENGTH) 500 mg tablet Take 1,000 mg by mouth two (2) times daily as needed for Pain.  sitagliptin (JANUVIA) 100 mg tablet Take 100 mg by mouth daily.  esomeprazole (NEXIUM) 40 mg capsule Take  by mouth daily.  metFORMIN (GLUCOPHAGE) 1,000 mg tablet Take 1,000 mg by mouth. 1 tablet every morning 1/2 tablet at noon 1 tablet every evening    metoprolol (LOPRESSOR) 50 mg tablet Take 50 mg by mouth daily.  losartan (COZAAR) 100 mg tablet Take 100 mg by mouth daily. No current facility-administered medications for this visit.         MD Akin Bai [facility] [place]  [address] [facility] [address]  [address]

## 2017-11-10 NOTE — LETTER
11/10/2017 4:20 PM 
 
Patient:  Hortencia Reina YOB: 1940 Date of Visit: 11/10/2017 Dear Jesus Barbosa MD 
[place] [address] VIA Facsimile: [phone] 
 : 
 
 
Ms. Hortencia Reina is a 77 yo F with DM, HTN, hyperlipidemia seen initially afib with RVR on 7/4/13. She was started on diltiazem added to her BB for rate control and coumadin for anticoagulation (newer agents were cost prohibitive). Nuclear stress test on 7/17/13 noted a small partly reversible anterior defect, EF > 65. She was asymptomatic decided to medically manage. 9/2014 echo was normal with EF 65%. Echo 10/2016 was normal.  Nuclear stress test 5/2017 noted a reversible anterior apical defect, but with normal ejection fraction, most consistent with shift in breast artifact and not true ischemia. She also had an ultrasound of her leg that was negative for DVT. It did show some valvular incompetence of the left greater saphenous vein at the level of the saphenofemoral junction. Since her last visit, she continues to do well. No chest pain. She has baseline shortness of breath that has been unchanged. No significant palpitations. She has been compliant with her medications. She did physical therapy for her legs and has joined a stretch flex class. She does some stationary bike at home as well. She is compensated on exam with clear lungs. Blood pressure is 132/70 with a heart rate of 66. Assessment and Plan: 1. Exertional shortness of breath. Her breathing has been stable. Stress test in the past was low risk and continue medical management. 2. Paroxysmal atrial fibrillation. Stable in sinus rhythm on beta-blocker, calcium channel blocker. 3. Chronic anticoagulation. No bleeding issues on Coumadin. 4. Lower extremity edema. Ultrasounds in the past with venous insufficiency, but no DVT.   RAINER in 05/2017 was normal.   
 5. Iron deficiency anemia. Followed by hematology, Dr. Ronaldo Banerjee. 6. Essential hypertension. Blood pressure is controlled and no changes made. History of white coat hypertension. 7. Mixed hyperlipidemia. Tolerating statin. 8. Type 2 diabetes. If you have questions, please do not hesitate to call me. Sincerely, Jana Albright MD

## 2018-02-06 RX ORDER — WARFARIN SODIUM 5 MG/1
TABLET ORAL
Qty: 90 TAB | Refills: 0 | Status: SHIPPED | OUTPATIENT
Start: 2018-02-06 | End: 2018-05-02 | Stop reason: SDUPTHER

## 2018-05-02 RX ORDER — WARFARIN SODIUM 5 MG/1
TABLET ORAL
Qty: 90 TAB | Refills: 0 | Status: SHIPPED | OUTPATIENT
Start: 2018-05-02 | End: 2018-09-24 | Stop reason: SDUPTHER

## 2018-05-15 ENCOUNTER — OFFICE VISIT (OUTPATIENT)
Dept: CARDIOLOGY CLINIC | Age: 78
End: 2018-05-15

## 2018-05-15 VITALS
DIASTOLIC BLOOD PRESSURE: 76 MMHG | RESPIRATION RATE: 16 BRPM | HEART RATE: 60 BPM | BODY MASS INDEX: 39.53 KG/M2 | WEIGHT: 214.8 LBS | SYSTOLIC BLOOD PRESSURE: 122 MMHG | HEIGHT: 62 IN

## 2018-05-15 DIAGNOSIS — Z01.818 PREOPERATIVE CLEARANCE: ICD-10-CM

## 2018-05-15 DIAGNOSIS — E66.01 SEVERE OBESITY (BMI 35.0-39.9) WITH COMORBIDITY (HCC): ICD-10-CM

## 2018-05-15 DIAGNOSIS — I48.0 PAROXYSMAL A-FIB (HCC): Primary | ICD-10-CM

## 2018-05-15 DIAGNOSIS — E11.9 CONTROLLED TYPE 2 DIABETES MELLITUS WITHOUT COMPLICATION, WITHOUT LONG-TERM CURRENT USE OF INSULIN (HCC): ICD-10-CM

## 2018-05-15 DIAGNOSIS — I10 BENIGN ESSENTIAL HTN: ICD-10-CM

## 2018-05-15 RX ORDER — METFORMIN HYDROCHLORIDE 500 MG/1
1 TABLET ORAL
Refills: 5 | COMMUNITY
Start: 2018-05-03

## 2018-05-15 NOTE — PROGRESS NOTES
[address]: [place]  [phone]    HPI:   Ms. Chelo Mejia is a [de-identified] yo F with DM, HTN, hyperlipidemia seen initially afib with RVR on 7/4/13. She was started on diltiazem added to her BB for rate control and coumadin for anticoagulation (newer agents were cost prohibitive). Nuclear stress test on 7/17/13 noted a small partly reversible anterior defect, EF > 65. She was asymptomatic decided to medically manage. 9/2014 echo was normal with EF 65%. Echo 10/2016 was normal.  Nuclear stress test 5/2017 noted a reversible anterior apical defect, but with normal ejection fraction, most consistent with shift in breast artifact and not true ischemia. She also had an ultrasound of her leg that was negative for DVT. It did show some valvular incompetence of the left greater saphenous vein at the level of the saphenofemoral junction. The patient is here for a preop cardiac evaluation prior to cataract surgery. She is going to have a cataract on her left eye done on 06/19/2018 and then on the right eye a week later with Dr. Alina Keane in [facility]. From a cardiac standpoint, she has been doing well with no chest pains. No palpitations. Her breathing has been stable. She does have some just general fatigue. She exercises three days a week. She is compensated on exam with clear lungs. She has lost some weight from 223 to 214 pounds. Assessment and Plan:   1. Paroxysmal atrial fibrillation. Preop cardiac evaluation. She is stable from a cardiac standpoint and low risk for cardiac complications. Coumadin can be held for five days prior and restarted when okay with Dr. Alina Keane. 2. Paroxysmal atrial fibrillation. Stable in sinus rhythm on beta-blocker and calcium channel blocker for rate control. Would continue these perioperatively. Her EKG today is normal sinus rhythm, heart rate of 60 and nonspecific ST-T wave abnormality. 3. Chronic anticoagulation.   Holding Coumadin as noted above. 4. Lower extremity edema. Ultrasounds in the past were consistent with venous insufficiency, but no DVT. RAINER in 2017 was normal.    5. Iron deficiency anemia. Followed by hematology, Dr. Martin Reyes. 6. Essential hypertension. Blood pressure is controlled and no changes made. History of white coat hypertension. 7. Mixed hyperlipidemia. Tolerating statin. 8. Type 2 diabetes. 9. Shortness of breath. Stress tests in the past have been low risk. Can continue medical management. PCP Adry Briones, [address] [facility]   [phone]     She  has a past medical history of Diabetes ([facility] [address] [facility] Essential hypertension; and Hyperlipidemia. All other systems negative except as above. PE  Vitals:    05/15/18 1306   BP: 122/76   Pulse: 60   Resp: 16   Weight: 214 lb 12.8 oz (97.4 kg)   Height: 5' 2\" (1.575 m)    Body mass index is 39.29 kg/(m^2).    General appearance - alert, well appearing, and in no distress  Mental status - affect appropriate to mood  Eyes - sclera anicteric, moist mucous membranes  Neck - supple, no JVD  Chest - clear to auscultation, no wheezes, rales or rhonchi  Heart - normal rate, regular rhythm, normal S1, S2, I-II/VI systolic murmur LUSB  Abdomen - soft, nontender, nondistended, no masses or organomegaly  Extremities - peripheral pulses normal, no pedal edema    Recent Labs:  No results found for: CHOL  No results found for: MIRIAN  No results found for: BUN  No results found for: K  No results found for: HBA1C, [id number]  No components found for: HGBI,  IHGB,  HGB,  HGBP,  WBHGB  No results found for: PLT, PLTEXT    Reviewed:  Past Medical History:   Diagnosis Date    [facility]     Essential hypertension     Hyperlipidemia      History   Smoking Status    Former Smoker   Smokeless Tobacco    Never Used     History   Alcohol Use    Yes     Comment: Occasional-approx 1 drink per week     Allergies   Allergen Reactions  Nickel Rash     Redness       Current Outpatient Prescriptions   Medication Sig    metFORMIN (GLUCOPHAGE) 500 mg tablet Take 1 Tab by mouth. 1 tablet by mouth at noon    warfarin (COUMADIN) 5 mg tablet TAKE 1 TABLET BY MOUTH EVERY DAY    atorvastatin (LIPITOR) 40 mg tablet TAKE 1 TABLET BY MOUTH DAILY    dilTIAZem CD (CARDIZEM CD) 120 mg ER capsule TAKE 1 CAPSULE BY MOUTH DAILY    fenofibrate (LOFIBRA) 160 mg tablet Take 160 mg by mouth daily.  tolterodine ER (DETROL LA) 4 mg ER capsule Take 4 mg by mouth daily.  acetaminophen (TYLENOL EXTRA STRENGTH) 500 mg tablet Take 1,000 mg by mouth two (2) times daily as needed for Pain.  sitagliptin (JANUVIA) 100 mg tablet Take 100 mg by mouth daily.  esomeprazole (NEXIUM) 40 mg capsule Take  by mouth daily.  metFORMIN (GLUCOPHAGE) 1,000 mg tablet Take 1,000 mg by mouth two (2) times a day.  metoprolol (LOPRESSOR) 50 mg tablet Take 50 mg by mouth daily.  losartan (COZAAR) 100 mg tablet Take 100 mg by mouth daily. No current facility-administered medications for this visit.         Brenda Romero MD  [facility] [place]  [address] [facility] [address]  [address]

## 2018-05-15 NOTE — LETTER
5/17/2018 3:08 PM 
 
Patient:  Eder Dickey YOB: 1940 Date of Visit: 5/15/2018 Dear Coco Weeks MD 
[place] [address] VIA Facsimile: [phone] 
 : 
Ms. Eder Dickey is a [de-identified] yo F with DM, HTN, hyperlipidemia seen initially afib with RVR on 7/4/13. She was started on diltiazem added to her BB for rate control and coumadin for anticoagulation (newer agents were cost prohibitive). Nuclear stress test on 7/17/13 noted a small partly reversible anterior defect, EF > 65. She was asymptomatic decided to medically manage. 9/2014 echo was normal with EF 65%. Echo 10/2016 was normal.  Nuclear stress test 5/2017 noted a reversible anterior apical defect, but with normal ejection fraction, most consistent with shift in breast artifact and not true ischemia. She also had an ultrasound of her leg that was negative for DVT. It did show some valvular incompetence of the left greater saphenous vein at the level of the saphenofemoral junction. The patient is here for a preop cardiac evaluation prior to cataract surgery. She is going to have a cataract on her left eye done on 06/19/2018 and then on the right eye a week later with Dr. Jalya Gould in [facility]. From a cardiac standpoint, she has been doing well with no chest pains. No palpitations. Her breathing has been stable. She does have some just general fatigue. She exercises three days a week. She is compensated on exam with clear lungs. She has lost some weight from 223 to 214 pounds. Assessment and Plan: 1. Paroxysmal atrial fibrillation. Preop cardiac evaluation. She is stable from a cardiac standpoint and low risk for cardiac complications. Coumadin can be held for five days prior and restarted when okay with Dr. Jayla Gould. 2. Paroxysmal atrial fibrillation. Stable in sinus rhythm on beta-blocker and calcium channel blocker for rate control.   Would continue these perioperatively. Her EKG today is normal sinus rhythm, heart rate of 60 and nonspecific ST-T wave abnormality. 3. Chronic anticoagulation. Holding Coumadin as noted above. 4. Lower extremity edema. Ultrasounds in the past were consistent with venous insufficiency, but no DVT. RAINER in 2017 was normal.   
5. Iron deficiency anemia. Followed by hematology, Dr. Rogelio Love. 6. Essential hypertension. Blood pressure is controlled and no changes made. History of white coat hypertension. 7. Shortness of breath. Stress tests in the past have been low risk. Can continue medical management. If you have questions, please do not hesitate to call me. Sincerely, Angely Chamberlain MD

## 2018-05-15 NOTE — MR AVS SNAPSHOT
[address] [facility] [address] [place] 
[phone] Patient: Damian Estrada MRN: [id number] MARIA DE JESUS:[birth year] Visit Information Date & Time Provider Department Dept. Phone Encounter #  
 5/15/2018  1:00 PM Vineet Salazar MD [facility] [address] [phone] [id number] Your Appointments 11/20/2018  1:00 PM  
ESTABLISHED PATIENT with Vineet Salazar MD  
[facility] ([facility]) Appt Note: 6 mo f/u per Dr. Alonzo Mate [address] [place] [address]  
[address] [facility] [address] [address] Upcoming Health Maintenance Date Due  
 FOOT EXAM Q1 2/16/1950 EYE EXAM RETINAL OR DILATED Q1 2/16/1948 DTaP/Tdap/Td series (1 - Tdap) 2/16/1959 ZOSTER VACCINE AGE 60> 12/16/1997 GLAUCOMA SCREENING Q2Y 2/16/2003 Bone Densitometry (Dexa) Screening 2/16/2003 Pneumococcal 65+ Low/Medium Risk (1 of 2 - PCV13) 2/16/2003 MEDICARE YEARLY EXAM 3/14/2018 HEMOGLOBIN A1C Q6M 6/12/2018 Influenza Age 5 to Adult 8/1/2018 MICROALBUMIN Q1 12/12/2018 LIPID PANEL Q1 12/12/2018 Allergies as of 5/15/2018  Review Complete On: 5/15/2018 By: Victorino Reyes Severity Noted Reaction Type Reactions Nickel  09/28/2015    Rash Redness Current Immunizations  Never Reviewed No immunizations on file. Not reviewed this visit You Were Diagnosed With   
  
 Codes Comments Benign essential HTN    -  Primary ICD-10-CM: I10 
ICD-9-CM: 401.1 Paroxysmal A-fib (HCC)     ICD-10-CM: I48.0 ICD-9-CM: 427.31 Preoperative clearance     ICD-10-CM: R51.992 ICD-9-CM: V72.84 Vitals BP Pulse Resp Height(growth percentile) Weight(growth percentile) BMI  
 122/76 (BP 1 Location: Right arm, BP Patient Position: Sitting) 60 16 5' 2\" (1.575 m) 214 lb 12.8 oz (97.4 kg) 39.29 kg/m2 Smoking Status Former Smoker Vitals History BMI and BSA Data Body Mass Index Body Surface Area  
 39.29 kg/m 2 2.06 m 2 Preferred Pharmacy Pharmacy Name Phone [facility] [address] Your Updated Medication List  
  
   
This list is accurate as of 5/15/18  1:51 PM.  Always use your most recent med list.  
  
  
  
  
 atorvastatin 40 mg tablet Commonly known as:  LIPITOR  
TAKE 1 TABLET BY MOUTH DAILY  
  
 dilTIAZem  mg ER capsule Commonly known as:  CARDIZEM CD  
TAKE 1 CAPSULE BY MOUTH DAILY  
  
 fenofibrate 160 mg tablet Commonly known as:  LOFIBRA Take 160 mg by mouth daily. JANUVIA 100 mg tablet Generic drug:  SITagliptin Take 100 mg by mouth daily. losartan 100 mg tablet Commonly known as:  COZAAR Take 100 mg by mouth daily. * metFORMIN 1,000 mg tablet Commonly known as:  GLUCOPHAGE Take 1,000 mg by mouth two (2) times a day. * metFORMIN 500 mg tablet Commonly known as:  GLUCOPHAGE Take 1 Tab by mouth. 1 tablet by mouth at noon  
  
 metoprolol tartrate 50 mg tablet Commonly known as:  LOPRESSOR Take 50 mg by mouth daily. NexIUM 40 mg capsule Generic drug:  esomeprazole Take  by mouth daily. tolterodine ER 4 mg ER capsule Commonly known as:  Rexene Elks Take 4 mg by mouth daily. TYLENOL EXTRA STRENGTH 500 mg tablet Generic drug:  acetaminophen Take 1,000 mg by mouth two (2) times daily as needed for Pain.  
  
 warfarin 5 mg tablet Commonly known as:  COUMADIN  
TAKE 1 TABLET BY MOUTH EVERY DAY  
  
 * Notice: This list has 2 medication(s) that are the same as other medications prescribed for you. Read the directions carefully, and ask your doctor or other care provider to review them with you. We Performed the Following AMB POC EKG ROUTINE W/ 12 LEADS, INTER & REP [96260 CPT(R)] Introducing [facility]! Christine Realanis introduces Directly patient portal. Now you can access parts of your medical record, email your doctor's office, and request medication refills online. 1. In your internet browser, go to [email or web address] 2. Click on the First Time User? Click Here link in the Sign In box. You will see the New Member Sign Up page. 3. Enter your Directly Access Code exactly as it appears below. You will not need to use this code after youve completed the sign-up process. If you do not sign up before the expiration date, you must request a new code. · Directly Access Code: [email or web address] Expires: 8/13/2018  1:51 PM 
 
4. Enter the last four digits of your Social Security Number (xxxx) and Date of Birth (mm/dd/yyyy) as indicated and click Submit. You will be taken to the next sign-up page. 5. Create a Directly ID. This will be your Directly login ID and cannot be changed, so think of one that is secure and easy to remember. 6. Create a Directly password. You can change your password at any time. 7. Enter your Password Reset Question and Answer. This can be used at a later time if you forget your password. 8. Enter your e-mail address. You will receive e-mail notification when new information is available in [address] 9. Click Sign Up. You can now view and download portions of your medical record. 10. Click the Download Summary menu link to download a portable copy of your medical information. If you have questions, please visit the Frequently Asked Questions section of the Directly website. Remember, Directly is NOT to be used for urgent needs. For medical emergencies, dial 911. Now available from your iPhone and Android! Please provide this summary of care documentation to your next provider. Your primary care clinician is listed as [employer]. If you have any questions after today's visit, please call [phone].

## 2018-05-30 ENCOUNTER — TELEPHONE (OUTPATIENT)
Dept: CARDIOLOGY CLINIC | Age: 78
End: 2018-05-30

## 2018-05-30 NOTE — TELEPHONE ENCOUNTER
Marla Bingham with [facility] needs and updated referral for this patient's Coumadin.   Phone [phone]

## 2018-06-01 NOTE — TELEPHONE ENCOUNTER
Spoke with Moriah at the [facility]. She indicated they were needing to know if patient will continue Coumadin, as they previously had a 6 month authorization for her to have INR monitored. Advised per last office visit notes from Dr. Saida Deutsch, the patient is to continue Coumadin indefinitely, indicated for atrial fibrillation. Moriah verbalized understanding and will call our office with any further questions or concerns.

## 2018-09-06 ENCOUNTER — TELEPHONE (OUTPATIENT)
Dept: CARDIOLOGY CLINIC | Age: 78
End: 2018-09-06

## 2018-09-06 NOTE — TELEPHONE ENCOUNTER
Dr. Marielos Stephenson wanted to let you know she got Dr. Stephanie Schafer message and thanks you for sending the records.   Phone: [phone]

## 2019-01-06 NOTE — PROGRESS NOTES
[address]: [place]  [phone]    HPI:   Ms. Simba Chopra is a [de-identified] yo F with DM, HTN, hyperlipidemia seen initially afib with RVR on 7/4/13. She was started on diltiazem added to her BB for rate control and coumadin for anticoagulation (newer agents were cost prohibitive). Nuclear stress test on 7/17/13 noted a small partly reversible anterior defect, EF > 65. She was asymptomatic decided to medically manage. 9/2014 echo was normal with EF 65%. Echo 10/2016 was normal.  Nuclear stress test 5/2017 noted a reversible anterior apical defect, but with normal ejection fraction, most consistent with shift in breast artifact and not true ischemia. She also had an ultrasound of her leg that was negative for DVT. It did show some valvular incompetence of the left greater saphenous vein at the level of the saphenofemoral junction. Since her last visit, she continues to do well. She denies any chest pain. She does have some baseline shortness of breath, but this has been okay. She has continued to lose weight going from 214 to 198 pounds. She says she has been exercising regularly and also after her hysterectomy, she seemed to have lost weight easier. Her cataract surgery in June went well. She is compensated on exam with clear lungs and trace lower extremity edema. She wears compression stockings. Blood pressure is 124/86 with a heart rate of 66. Assessment and Plan:   1. Paroxysmal atrial fibrillation. Stable and compensated and well rate controlled in sinus rhythm. Will have her follow back in six months. 2. Chronic anticoagulation. She has been on Coumadin in the past.  She asked a question about Eliquis and gave her a prescription for this, which she will price out. If this is not cost prohibitive, I do think a switch to Eliquis or Coumadin is a good idea. 3. Venous insufficiency.   Ultrasound of her legs in the past was normal.  RAINER in 2017 was normal.    4. Iron deficiency anemia. Followed by hematology, Dr. Kera Gee. 5. Essential hypertension. Blood pressure is controlled and no changes made. History of white coat hypertension. 6. Mixed hyperlipidemia. Tolerating statin. 7. Type 2 diabetes. [facility] [address] [place] [address] Road   [phone]     She  has a past medical history of Arrhythmia, Diabetes ([facility] Essential hypertension, Hyperlipidemia, and Morbid obesity ([facility] All other systems negative except as above. PE  Vitals:    01/07/19 1125   BP: 124/86   Pulse: 66   Resp: 18   SpO2: 96%   Weight: 198 lb 6.4 oz (90 kg)    Body mass index is 36.29 kg/m².    General appearance - alert, well appearing, and in no distress  Mental status - affect appropriate to mood  Eyes - sclera anicteric, moist mucous membranes  Neck - supple, no JVD  Chest - clear to auscultation, no wheezes, rales or rhonchi  Heart - normal rate, regular rhythm, normal S1, S2, I-II/VI systolic murmur LUSB  Abdomen - soft, nontender, nondistended, no masses or organomegaly  Extremities - peripheral pulses normal, no pedal edema    Recent Labs:  No results found for: CHOL  No results found for: MIRIAN  No results found for: BUN  No results found for: K  No results found for: HBA1C, [id number]  No components found for: HGBI,  IHGB,  HGB,  HGBP,  WBHGB  No results found for: PLT, PLTEXT    Reviewed:  Past Medical History:   Diagnosis Date    Arrhythmia     Diabetes ([facility]     Essential hypertension     Hyperlipidemia     Morbid obesity ([facility]      Social History     Tobacco Use   Smoking Status Former Smoker   Smokeless Tobacco Never Used     Social History     Substance and Sexual Activity   Alcohol Use Yes    Comment: Occasional-approx 1 drink per week     Allergies   Allergen Reactions    Nickel Rash     Redness       Current Outpatient Medications   Medication Sig    warfarin (COUMADIN) 5 mg tablet TAKE 1 TABLET BY MOUTH EVERY DAY (Patient taking differently: TAKE 1 TABLET BY MOUTH M-W-F AND HALF TABLET ON TUE-THU-SAT-SUN.)    atorvastatin (LIPITOR) 40 mg tablet TAKE 1 TABLET BY MOUTH DAILY    dilTIAZem CD (CARDIZEM CD) 120 mg ER capsule TAKE 1 CAPSULE BY MOUTH DAILY    metFORMIN (GLUCOPHAGE) 500 mg tablet Take 1 Tab by mouth. 1 tablet by mouth at noon    fenofibrate (LOFIBRA) 160 mg tablet Take 160 mg by mouth daily.  tolterodine ER (DETROL LA) 4 mg ER capsule Take 4 mg by mouth two (2) times a day.  acetaminophen (TYLENOL EXTRA STRENGTH) 500 mg tablet Take 1,000 mg by mouth two (2) times daily as needed for Pain.  sitagliptin (JANUVIA) 100 mg tablet Take 100 mg by mouth daily.  esomeprazole (NEXIUM) 40 mg capsule Take  by mouth daily.  metFORMIN (GLUCOPHAGE) 1,000 mg tablet Take 1,000 mg by mouth two (2) times a day.  metoprolol (LOPRESSOR) 50 mg tablet Take 50 mg by mouth daily.  losartan (COZAAR) 100 mg tablet Take 100 mg by mouth daily. No current facility-administered medications for this visit.         MD Feliciano Swanson [facility] heart and Vascular [place]  [address] [facility] [address]  [address]

## 2019-01-07 ENCOUNTER — OFFICE VISIT (OUTPATIENT)
Dept: CARDIOLOGY CLINIC | Age: 79
End: 2019-01-07

## 2019-01-07 VITALS
OXYGEN SATURATION: 96 % | RESPIRATION RATE: 18 BRPM | WEIGHT: 198.4 LBS | DIASTOLIC BLOOD PRESSURE: 86 MMHG | BODY MASS INDEX: 36.29 KG/M2 | HEART RATE: 66 BPM | SYSTOLIC BLOOD PRESSURE: 124 MMHG

## 2019-01-07 DIAGNOSIS — E66.01 SEVERE OBESITY (BMI 35.0-39.9) WITH COMORBIDITY (HCC): ICD-10-CM

## 2019-01-07 DIAGNOSIS — Z79.01 CHRONIC ANTICOAGULATION: ICD-10-CM

## 2019-01-07 DIAGNOSIS — I48.0 PAROXYSMAL A-FIB (HCC): Primary | ICD-10-CM

## 2019-01-07 DIAGNOSIS — I10 BENIGN ESSENTIAL HTN: ICD-10-CM

## 2019-01-07 DIAGNOSIS — E11.9 CONTROLLED TYPE 2 DIABETES MELLITUS WITHOUT COMPLICATION, WITHOUT LONG-TERM CURRENT USE OF INSULIN (HCC): ICD-10-CM

## 2019-01-07 NOTE — LETTER
1/7/2019 6:03 PM 
 
Patient:  Alexandre Bhagat YOB: 1940 Date of Visit: 1/7/2019 Dear Tonio Samaniego MD 
[place] [address] VIA Facsimile: [phone] 
 : 
 
Ms. Alexandre Bhagat is a [de-identified] yo F with DM, HTN, hyperlipidemia seen initially afib with RVR on 7/4/13. She was started on diltiazem added to her BB for rate control and coumadin for anticoagulation (newer agents were cost prohibitive). Nuclear stress test on 7/17/13 noted a small partly reversible anterior defect, EF > 65. She was asymptomatic decided to medically manage. 9/2014 echo was normal with EF 65%. Echo 10/2016 was normal.  Nuclear stress test 5/2017 noted a reversible anterior apical defect, but with normal ejection fraction, most consistent with shift in breast artifact and not true ischemia. She also had an ultrasound of her leg that was negative for DVT. It did show some valvular incompetence of the left greater saphenous vein at the level of the saphenofemoral junction. Since her last visit, she continues to do well. She denies any chest pain. She does have some baseline shortness of breath, but this has been okay. She has continued to lose weight going from 214 to 198 pounds. She says she has been exercising regularly and also after her hysterectomy, she seemed to have lost weight easier. Her cataract surgery in June went well. She is compensated on exam with clear lungs and trace lower extremity edema. She wears compression stockings. Blood pressure is 124/86 with a heart rate of 66. Assessment and Plan: 1. Paroxysmal atrial fibrillation. Stable and compensated and well rate controlled in sinus rhythm. Will have her follow back in six months. 2. Chronic anticoagulation. She has been on Coumadin in the past.  She asked a question about Eliquis and gave her a prescription for this, which she will price out.   If this is not cost prohibitive, I do think a switch to Eliquis or Coumadin is a good idea. 3. Venous insufficiency. Ultrasound of her legs in the past was normal.  RAINER in 2017 was normal.   
4. Iron deficiency anemia. Followed by hematology, Dr. Annalee Humphries. 5. Essential hypertension. Blood pressure is controlled and no changes made. History of white coat hypertension. 6. Mixed hyperlipidemia. Tolerating statin. 7. Type 2 diabetes. If you have questions, please do not hesitate to call me. Sincerely, Philly Sen MD

## 2019-01-07 NOTE — PROGRESS NOTES
Chief Complaint   Patient presents with    Foot Swelling     Follow-up    Medication Evaluation     Warfarin       1. Have you been to the ER, urgent care clinic since your last visit? Hospitalized since your last visit? Yes When: 11/21/18 Where: [employer] [facility] [employer] Reason for visit: Fall    2. Have you seen or consulted any other health care providers outside of the [address] [place] [address] since your last visit? Include any pap smears or colon screening. No    3) Do you have an Advance Directive on file? no    Patient is accompanied by self I have received verbal consent from Mackenzie Cochran to discuss any/all medical information while they are present in the room.

## 2019-07-29 ENCOUNTER — OFFICE VISIT (OUTPATIENT)
Dept: CARDIOLOGY CLINIC | Age: 79
End: 2019-07-29

## 2019-07-29 VITALS
SYSTOLIC BLOOD PRESSURE: 150 MMHG | OXYGEN SATURATION: 97 % | DIASTOLIC BLOOD PRESSURE: 70 MMHG | HEIGHT: 62 IN | BODY MASS INDEX: 35.51 KG/M2 | RESPIRATION RATE: 16 BRPM | HEART RATE: 64 BPM | WEIGHT: 193 LBS

## 2019-07-29 DIAGNOSIS — Z79.01 CHRONIC ANTICOAGULATION: ICD-10-CM

## 2019-07-29 DIAGNOSIS — E11.9 CONTROLLED TYPE 2 DIABETES MELLITUS WITHOUT COMPLICATION, WITHOUT LONG-TERM CURRENT USE OF INSULIN (HCC): ICD-10-CM

## 2019-07-29 DIAGNOSIS — I48.0 PAROXYSMAL A-FIB (HCC): Primary | ICD-10-CM

## 2019-07-29 DIAGNOSIS — E78.2 MIXED HYPERLIPIDEMIA: ICD-10-CM

## 2019-07-29 DIAGNOSIS — I87.2 VENOUS INSUFFICIENCY: ICD-10-CM

## 2019-07-29 DIAGNOSIS — I10 BENIGN ESSENTIAL HTN: ICD-10-CM

## 2019-07-29 NOTE — PROGRESS NOTES
[address]: [place]  [phone]    HPI:   Ms. Fer Ordoñez is a 79 yo F with DM, HTN, hyperlipidemia seen initially afib with RVR on 7/4/13. She was started on diltiazem added to her BB for rate control and coumadin for anticoagulation (newer agents were cost prohibitive). Nuclear stress test on 7/17/13 noted a small partly reversible anterior defect, EF > 65. She was asymptomatic decided to medically manage. 9/2014 echo was normal with EF 65%. Echo 10/2016 was normal.  Nuclear stress test 5/2017 noted a reversible anterior apical defect, but with normal ejection fraction, most consistent with shift in breast artifact and not true ischemia. She also had an ultrasound of her leg that was negative for DVT. It did show some valvular incompetence of the left greater saphenous vein at the level of the saphenofemoral junction. Since her last visit, she has been doing okay. She denies any chest pain or palpitations. She has some baseline shortness of breath, but this is unchanged. She has lost some weight going from 198 to 193 pounds. Her blood pressure has been slightly elevated when she sees her doctors, but at home it has been in the 130s and she has been checking her blood pressure a couple of times a week for the last few weeks. She has been compliant with her medications. No bleeding issues on Coumadin. Eliquis was cost prohibitive. She is compensated on exam with clear lungs and trace lower extremity edema. Her EKG today was normal sinus rhythm, heart rate of 64 bpm.   2/17/20 Onc appt. Dr. Nate Catalan [facility]: Stage 3A lung CA. Arvind had PE despite coumadin; transitioned to eliquis. H/o endometrial CA. Assessment and Plan:    1. Paroxysmal atrial fibrillation. Stable and well controlled in sinus rhythm. Will have her follow back in six months. 2. Chronic anticoagulation. No bleeding issues on Coumadin. Eliquis was cost prohibitive. 3. Venous insufficiency.   Ultrasound of her legs in the past was normal.  RAINER in 2017 was normal.    4. Iron deficiency anemia. Followed by hematology, Dr. Cass Garcia. 5. Essential hypertension. Blood pressure is overall controlled and normal, resting blood pressures at home. She does have white coat hypertension. 6. Mixed hyperlipidemia. Tolerating statin. 7. Type 2 diabetes. [facility] [place] [address] [facility] [address]   [phone]     She  has a past medical history of Arrhythmia, Diabetes ([address] Essential hypertension, Hyperlipidemia, and Morbid obesity ([address] She also has no past medical history of Aneurysm ([address] Arthritis, Asthma, Autoimmune disease ([address] CAD (coronary artery disease), Cancer ([address] Chronic kidney disease, Chronic obstructive pulmonary disease ([address] Chronic pain, Coagulation disorder ([address] Endocarditis, GERD (gastroesophageal reflux disease), Heart failure ([address] Ill-defined condition, Liver disease, Nicotine vapor product user, Non-nicotine vapor product user, Psychiatric disorder, PUD (peptic ulcer disease), Rheumatic fever, Seizures ([address] Sleep apnea, Stroke ([address] Thromboembolus ([address] or Thyroid disease. All other systems negative except as above. PE  Vitals:    07/29/19 1413   BP: 150/70   Pulse: 64   Resp: 16   SpO2: 97%   Weight: 193 lb (87.5 kg)   Height: 5' 2\" (1.575 m)    Body mass index is 35.3 kg/m².    General appearance - alert, well appearing, and in no distress  Mental status - affect appropriate to mood  Eyes - sclera anicteric, moist mucous membranes  Neck - supple, no JVD  Chest - clear to auscultation, no wheezes, rales or rhonchi  Heart - normal rate, regular rhythm, normal S1, S2, I-II/VI systolic murmur LUSB  Abdomen - soft, nontender, nondistended, no masses or organomegaly  Extremities - peripheral pulses normal, no pedal edema    Recent Labs:  No results found for: CHOL  No results found for: MIRIAN  No results found for: BUN  No results found for: K  No results found for: HBA1C, [id number]  No components found for: HGBI,  IHGB,  HGB,  HGBP,  WBHGB  No results found for: PLT, PLTEXT    Reviewed:  Past Medical History:   Diagnosis Date    Arrhythmia     [facility]     Essential hypertension     Hyperlipidemia     Morbid obesity ([facility]      Social History     Tobacco Use   Smoking Status Former Smoker   Smokeless Tobacco Never Used     Social History     Substance and Sexual Activity   Alcohol Use Yes    Comment: Occasional-approx 1 drink per week     Allergies   Allergen Reactions    Nickel Rash     Redness       Current Outpatient Medications   Medication Sig    warfarin (COUMADIN) 5 mg tablet TAKE 1 TABLET BY MOUTH EVERY DAY (Patient taking differently: TAKE 1 TABLET BY MOUTH M-W-F AND HALF TABLET ON TUE-THU-SAT-SUN.)    atorvastatin (LIPITOR) 40 mg tablet TAKE 1 TABLET BY MOUTH DAILY    dilTIAZem CD (CARDIZEM CD) 120 mg ER capsule TAKE 1 CAPSULE BY MOUTH DAILY    metFORMIN (GLUCOPHAGE) 500 mg tablet Take 1 Tab by mouth. 1 tablet by mouth at noon    fenofibrate (LOFIBRA) 160 mg tablet Take 160 mg by mouth daily.  tolterodine ER (DETROL LA) 4 mg ER capsule Take 4 mg by mouth two (2) times a day.  acetaminophen (TYLENOL EXTRA STRENGTH) 500 mg tablet Take 1,000 mg by mouth two (2) times daily as needed for Pain.  sitagliptin (JANUVIA) 100 mg tablet Take 100 mg by mouth daily.  esomeprazole (NEXIUM) 40 mg capsule Take  by mouth daily.  metFORMIN (GLUCOPHAGE) 1,000 mg tablet Take 1,000 mg by mouth two (2) times a day.  metoprolol (LOPRESSOR) 50 mg tablet Take 50 mg by mouth daily.  losartan (COZAAR) 100 mg tablet Take 100 mg by mouth daily.  apixaban (ELIQUIS) 5 mg tablet Take 1 Tab by mouth two (2) times a day. No current facility-administered medications for this visit.         Korey Blair MD  [facility] heart and Vascular [place]  [address] [facility] [address]  [address]

## 2019-07-29 NOTE — LETTER
7/30/2019 8:21 AM 
 
Patient:  Zay Bradford YOB: 1940 Date of Visit: 7/29/2019 Dear Jocelynn Roth MD 
[address] [address] VIA Facsimile: [phone] 
 : 
 
Ms. Zay Bradford is a 77 yo F with DM, HTN, hyperlipidemia seen initially afib with RVR on 7/4/13. She was started on diltiazem added to her BB for rate control and coumadin for anticoagulation (newer agents were cost prohibitive). Nuclear stress test on 7/17/13 noted a small partly reversible anterior defect, EF > 65. She was asymptomatic decided to medically manage. 9/2014 echo was normal with EF 65%. Echo 10/2016 was normal.  Nuclear stress test 5/2017 noted a reversible anterior apical defect, but with normal ejection fraction, most consistent with shift in breast artifact and not true ischemia. She also had an ultrasound of her leg that was negative for DVT. It did show some valvular incompetence of the left greater saphenous vein at the level of the saphenofemoral junction. Since her last visit, she has been doing okay. She denies any chest pain or palpitations. She has some baseline shortness of breath, but this is unchanged. She has lost some weight going from 198 to 193 pounds. Her blood pressure has been slightly elevated when she sees her doctors, but at home it has been in the 130s and she has been checking her blood pressure a couple of times a week for the last few weeks. She has been compliant with her medications. No bleeding issues on Coumadin. Eliquis was cost prohibitive. She is compensated on exam with clear lungs and trace lower extremity edema. Her EKG today was normal sinus rhythm, heart rate of 64 bpm.   
Assessment and Plan: 1. Paroxysmal atrial fibrillation. Stable and well controlled in sinus rhythm. Will have her follow back in six months. 2. Chronic anticoagulation. No bleeding issues on Coumadin. Eliquis was cost prohibitive. 3. Venous insufficiency. Ultrasound of her legs in the past was normal.  RAINER in 2017 was normal.   
4. Iron deficiency anemia. Followed by hematology, Dr. Eloy Starr. 5. Essential hypertension. Blood pressure is overall controlled and normal, resting blood pressures at home. She does have white coat hypertension. 6. Mixed hyperlipidemia. Tolerating statin. 7. Type 2 diabetes. If you have questions, please do not hesitate to call me. Sincerely, Gill Rodríguez MD

## 2019-08-23 ENCOUNTER — TELEPHONE (OUTPATIENT)
Dept: CARDIOLOGY CLINIC | Age: 79
End: 2019-08-23

## 2019-08-23 NOTE — TELEPHONE ENCOUNTER
Patient states she is having a needle biopsy on 9/3/19 with [facility]. She would like to know if she can hold the coumadin she takes and if she will be needing Lovenox? Please advise.        Phone: [phone]

## 2019-08-23 NOTE — TELEPHONE ENCOUNTER
Returned call to patient. Two patient indentifiers verified. Pt was informed of the message. Pt verbalized understanding and denies any further questions at this time.      Future Appointments   Date Time Provider Lucita Churchill   1/29/2020  1:00 PM Mariana Lopez  [address]

## 2019-08-23 NOTE — TELEPHONE ENCOUNTER
Oly France MD  You 4 minutes ago (1:23 PM)      Does not need lovenox bridge. Can hold coumadin.  thx

## 2019-09-03 RX ORDER — ATORVASTATIN CALCIUM 40 MG/1
TABLET, FILM COATED ORAL
Qty: 90 TAB | Refills: 1 | Status: SHIPPED | OUTPATIENT
Start: 2019-09-03 | End: 2020-04-20

## 2019-09-03 NOTE — TELEPHONE ENCOUNTER
Requested Prescriptions     Signed Prescriptions Disp Refills    atorvastatin (LIPITOR) 40 mg tablet 90 Tab 1     Sig: TAKE 1 TABLET BY MOUTH DAILY     Authorizing Provider: Jc Nelson     Ordering User: Krishan Johnson       Last office visit 7/29/19    Per Dr. Juan C Day   Date Time Provider Lucita Churchill   1/29/2020  1:00 PM Gabriella Caban  [address]

## 2019-09-06 ENCOUNTER — TELEPHONE (OUTPATIENT)
Dept: CARDIOLOGY CLINIC | Age: 79
End: 2019-09-06

## 2019-09-06 NOTE — TELEPHONE ENCOUNTER
Patient states that she is supposed to stop Eliquis today prior to a procedure for chemo next Friday and she was curious as to whether or not she should be taking a different medication. Please advise.     Phone #: [phone]  Thanks

## 2019-09-10 RX ORDER — DILTIAZEM HYDROCHLORIDE 120 MG/1
CAPSULE, COATED, EXTENDED RELEASE ORAL
Qty: 90 CAP | Refills: 3 | Status: SHIPPED | OUTPATIENT
Start: 2019-09-10

## 2019-09-10 NOTE — TELEPHONE ENCOUNTER
Requested Prescriptions     Signed Prescriptions Disp Refills    dilTIAZem CD (CARDIZEM CD) 120 mg ER capsule 90 Cap 3     Sig: TAKE 1 CAPSULE BY MOUTH DAILY     Authorizing Provider: Bridgette Pelaez     Ordering User: Hubert Andrews       Last office visit 7/29/19    Per Dr. Shila Gerardo   Date Time Provider Lucita Churchill   1/29/2020  1:00 PM Sarah Naranjo  [address]

## 2019-09-30 RX ORDER — WARFARIN SODIUM 5 MG/1
TABLET ORAL
Qty: 30 TAB | Refills: 8 | OUTPATIENT
Start: 2019-09-30

## 2020-01-13 ENCOUNTER — TELEPHONE (OUTPATIENT)
Dept: CARDIOLOGY CLINIC | Age: 80
End: 2020-01-13

## 2020-01-13 RX ORDER — WARFARIN SODIUM 5 MG/1
TABLET ORAL
Qty: 90 TAB | Refills: 2 | OUTPATIENT
Start: 2020-01-13

## 2020-01-13 NOTE — TELEPHONE ENCOUNTER
Pharmacy confirmed. Patient is out of medication. Per the pharmacist said the doctors office can't refill the medication(warfarin).  Please advise      Phone: [phone]

## 2020-04-20 RX ORDER — ATORVASTATIN CALCIUM 40 MG/1
TABLET, FILM COATED ORAL
Qty: 30 TAB | Refills: 5 | Status: SHIPPED | OUTPATIENT
Start: 2020-04-20

## 2020-04-20 NOTE — TELEPHONE ENCOUNTER
Requested Prescriptions     Signed Prescriptions Disp Refills    atorvastatin (LIPITOR) 40 mg tablet 30 Tab 5     Sig: TAKE 1 TABLET BY MOUTH DAILY     Authorizing Provider: Leia Aguila     Ordering User: Vanesa Torres       Last office visit 7/29/19    Per Dr. Yuni Bruno     No future appointments.

## 2020-08-26 RX ORDER — ATORVASTATIN CALCIUM 40 MG/1
TABLET, FILM COATED ORAL
Qty: 30 TAB | Refills: 5 | OUTPATIENT
Start: 2020-08-26

## 2020-09-14 RX ORDER — DILTIAZEM HYDROCHLORIDE 120 MG/1
CAPSULE, COATED, EXTENDED RELEASE ORAL
Qty: 30 CAP | Refills: 11 | OUTPATIENT
Start: 2020-09-14

## 2020-09-17 RX ORDER — DILTIAZEM HYDROCHLORIDE 120 MG/1
CAPSULE, COATED, EXTENDED RELEASE ORAL
Qty: 90 CAP | Refills: 3 | OUTPATIENT
Start: 2020-09-17

## 2022-03-19 PROBLEM — E66.01 SEVERE OBESITY (BMI 35.0-39.9) WITH COMORBIDITY (HCC): Status: ACTIVE | Noted: 2018-05-15

## 2022-03-19 PROBLEM — I73.9 CLAUDICATION OF BOTH LOWER EXTREMITIES (HCC): Status: ACTIVE | Noted: 2017-05-11

## 2022-03-19 PROBLEM — I83.899 VARICOSE VEINS OF LOWER EXTREMITY WITH EDEMA: Status: ACTIVE | Noted: 2017-05-11

## 2022-03-19 PROBLEM — I10 BENIGN ESSENTIAL HTN: Status: ACTIVE | Noted: 2017-05-11

## 2022-03-20 PROBLEM — E11.9 CONTROLLED TYPE 2 DIABETES MELLITUS WITHOUT COMPLICATION, WITHOUT LONG-TERM CURRENT USE OF INSULIN (HCC): Status: ACTIVE | Noted: 2017-05-11

## 2023-05-17 RX ORDER — WARFARIN SODIUM 5 MG/1
1 TABLET ORAL DAILY
COMMUNITY
Start: 2018-09-24

## 2023-05-17 RX ORDER — DILTIAZEM HYDROCHLORIDE 120 MG/1
1 CAPSULE, EXTENDED RELEASE ORAL DAILY
COMMUNITY
Start: 2019-09-10

## 2023-05-17 RX ORDER — ACETAMINOPHEN 500 MG
1000 TABLET ORAL 2 TIMES DAILY PRN
COMMUNITY

## 2023-05-17 RX ORDER — FENOFIBRATE 160 MG/1
160 TABLET ORAL DAILY
COMMUNITY

## 2023-05-17 RX ORDER — ESOMEPRAZOLE MAGNESIUM 40 MG/1
CAPSULE, DELAYED RELEASE ORAL DAILY
COMMUNITY

## 2023-05-17 RX ORDER — ATORVASTATIN CALCIUM 40 MG/1
1 TABLET, FILM COATED ORAL DAILY
COMMUNITY
Start: 2020-04-20

## 2023-05-17 RX ORDER — TOLTERODINE 4 MG/1
4 CAPSULE, EXTENDED RELEASE ORAL 2 TIMES DAILY
COMMUNITY
Start: 2015-04-06

## 2023-05-17 RX ORDER — METOPROLOL TARTRATE 50 MG/1
50 TABLET, FILM COATED ORAL DAILY
COMMUNITY

## 2023-05-17 RX ORDER — LOSARTAN POTASSIUM 100 MG/1
100 TABLET ORAL DAILY
COMMUNITY